# Patient Record
Sex: FEMALE | Race: WHITE | NOT HISPANIC OR LATINO | Employment: FULL TIME
[De-identification: names, ages, dates, MRNs, and addresses within clinical notes are randomized per-mention and may not be internally consistent; named-entity substitution may affect disease eponyms.]

---

## 2017-10-29 ENCOUNTER — HEALTH MAINTENANCE LETTER (OUTPATIENT)
Age: 32
End: 2017-10-29

## 2020-03-01 ENCOUNTER — HEALTH MAINTENANCE LETTER (OUTPATIENT)
Age: 35
End: 2020-03-01

## 2020-12-14 ENCOUNTER — HEALTH MAINTENANCE LETTER (OUTPATIENT)
Age: 35
End: 2020-12-14

## 2021-04-17 ENCOUNTER — HEALTH MAINTENANCE LETTER (OUTPATIENT)
Age: 36
End: 2021-04-17

## 2021-10-02 ENCOUNTER — HEALTH MAINTENANCE LETTER (OUTPATIENT)
Age: 36
End: 2021-10-02

## 2022-01-27 ENCOUNTER — APPOINTMENT (OUTPATIENT)
Dept: GENERAL RADIOLOGY | Facility: CLINIC | Age: 37
End: 2022-01-27
Attending: INTERNAL MEDICINE
Payer: COMMERCIAL

## 2022-01-27 ENCOUNTER — HOSPITAL ENCOUNTER (EMERGENCY)
Facility: CLINIC | Age: 37
Discharge: HOME | End: 2022-01-28
Attending: INTERNAL MEDICINE | Admitting: INTERNAL MEDICINE
Payer: COMMERCIAL

## 2022-01-27 DIAGNOSIS — S82.892A ANKLE FRACTURE, LEFT, CLOSED, INITIAL ENCOUNTER: ICD-10-CM

## 2022-01-27 DIAGNOSIS — R55 VASOVAGAL SYNCOPE: ICD-10-CM

## 2022-01-27 PROCEDURE — 250N000013 HC RX MED GY IP 250 OP 250 PS 637: Performed by: INTERNAL MEDICINE

## 2022-01-27 PROCEDURE — 999N000065 XR ANKLE LEFT G/E 3 VIEWS: Mod: LT

## 2022-01-27 PROCEDURE — 27788 TREATMENT OF ANKLE FRACTURE: CPT | Mod: 54 | Performed by: INTERNAL MEDICINE

## 2022-01-27 PROCEDURE — 99285 EMERGENCY DEPT VISIT HI MDM: CPT | Mod: 25 | Performed by: INTERNAL MEDICINE

## 2022-01-27 PROCEDURE — 73610 X-RAY EXAM OF ANKLE: CPT | Mod: LT

## 2022-01-27 PROCEDURE — 27788 TREATMENT OF ANKLE FRACTURE: CPT | Mod: LT | Performed by: INTERNAL MEDICINE

## 2022-01-27 PROCEDURE — 99284 EMERGENCY DEPT VISIT MOD MDM: CPT | Mod: 25 | Performed by: INTERNAL MEDICINE

## 2022-01-27 RX ORDER — OXYCODONE AND ACETAMINOPHEN 5; 325 MG/1; MG/1
1 TABLET ORAL
Qty: 8 TABLET | Refills: 0 | Status: SHIPPED | OUTPATIENT
Start: 2022-01-27 | End: 2022-01-30

## 2022-01-27 RX ORDER — OXYCODONE AND ACETAMINOPHEN 5; 325 MG/1; MG/1
1 TABLET ORAL ONCE
Status: COMPLETED | OUTPATIENT
Start: 2022-01-27 | End: 2022-01-27

## 2022-01-27 RX ADMIN — OXYCODONE HYDROCHLORIDE AND ACETAMINOPHEN 1 TABLET: 5; 325 TABLET ORAL at 22:26

## 2022-01-28 ENCOUNTER — APPOINTMENT (OUTPATIENT)
Dept: GENERAL RADIOLOGY | Facility: CLINIC | Age: 37
End: 2022-01-28
Payer: COMMERCIAL

## 2022-01-28 ENCOUNTER — TELEPHONE (OUTPATIENT)
Dept: ORTHOPEDICS | Facility: CLINIC | Age: 37
End: 2022-01-28
Payer: COMMERCIAL

## 2022-01-28 ENCOUNTER — APPOINTMENT (OUTPATIENT)
Dept: GENERAL RADIOLOGY | Facility: CLINIC | Age: 37
End: 2022-01-28
Attending: EMERGENCY MEDICINE
Payer: COMMERCIAL

## 2022-01-28 VITALS
SYSTOLIC BLOOD PRESSURE: 107 MMHG | HEART RATE: 70 BPM | TEMPERATURE: 98.2 F | BODY MASS INDEX: 27.63 KG/M2 | WEIGHT: 156 LBS | RESPIRATION RATE: 18 BRPM | OXYGEN SATURATION: 97 % | DIASTOLIC BLOOD PRESSURE: 68 MMHG

## 2022-01-28 PROCEDURE — 999N000065 XR ANKLE LEFT G/E 3 VIEWS: Mod: LT

## 2022-01-28 PROCEDURE — 250N000013 HC RX MED GY IP 250 OP 250 PS 637: Performed by: EMERGENCY MEDICINE

## 2022-01-28 RX ORDER — OXYCODONE AND ACETAMINOPHEN 5; 325 MG/1; MG/1
1 TABLET ORAL ONCE
Status: COMPLETED | OUTPATIENT
Start: 2022-01-28 | End: 2022-01-28

## 2022-01-28 RX ADMIN — OXYCODONE HYDROCHLORIDE AND ACETAMINOPHEN 1 TABLET: 5; 325 TABLET ORAL at 03:24

## 2022-01-28 ASSESSMENT — ENCOUNTER SYMPTOMS
ABDOMINAL PAIN: 0
HEADACHES: 0
DIFFICULTY URINATING: 0
ARTHRALGIAS: 0
CONFUSION: 0
SHORTNESS OF BREATH: 0
NECK STIFFNESS: 0
COLOR CHANGE: 0
EYE REDNESS: 0
FEVER: 0

## 2022-01-28 NOTE — TELEPHONE ENCOUNTER
received a call from the call center about triaging this patients fracture. I spoke with Dr. Mandujano and Frandy about this patients ankle fracture. They stated that this was a surgical case and Dr. Mandujano stated that he would be able to see it on Wednesday next week since the surgery would need to be within 2 weeks. I provided the call center with an appointment time on Wednesday February 1st at 2:30 pm. Since this was the only time available and the other providers who would be able to see for this do not have availability until February 11th.    Yes

## 2022-01-28 NOTE — ED PROVIDER NOTES
"    [facility] EMERGENCY DEPARTMENT ([facility])       1/27/22  History     Chief Complaint   Patient presents with     Ankle Pain     fell and collided with another  tonight around 2000, ended up hurting her left ankle     The history is provided by the patient and medical records.     Mone Torres is a 36 year old female who presents to the Emergency Department for evaluation of left ankle pain. Patient fell while playing hockey and collided into another player at 8pm tonight. She states \"my toes went one way and my foot went another.\" Patient cannot bear weight on the left leg. She states the pain is in the lateral malleolus of the left ankle.      Past Medical History  History reviewed. No pertinent past medical history.  History reviewed. No pertinent surgical history.  oxyCODONE-acetaminophen (PERCOCET) 5-325 MG tablet  modafinil (PROVIGIL) 200 MG tablet      No Known Allergies  Family History  Family History   Problem Relation Age of Onset     Eye Disorder Mother      Thyroid Disease Mother      Hypertension Father      Eye Disorder Father      Alzheimer Disease Maternal Grandmother      Congenital Anomalies Maternal Grandmother      Eye Disorder Maternal Grandmother      Diabetes Maternal Grandfather      Cancer Maternal Grandfather      Asthma Paternal Grandmother      Diabetes Paternal Grandmother      Cancer Paternal Grandmother      Eye Disorder Paternal Grandmother      Respiratory Paternal Grandmother      Hypertension Sister      Eye Disorder Sister      Social History   Social History     Tobacco Use     Smoking status: Current Some Day Smoker     Smokeless tobacco: Never Used   Substance Use Topics     Alcohol use: Yes     Drug use: No      Past medical history, past surgical history, medications, allergies, family history, and social history were reviewed with the patient. No additional pertinent items.     I have reviewed the Medications, Allergies, Past Medical and Surgical " History, and Social History in the Epic system.    Review of Systems   Constitutional: Negative for fever.   HENT: Negative for congestion.    Eyes: Negative for redness.   Respiratory: Negative for shortness of breath.    Cardiovascular: Negative for chest pain.   Gastrointestinal: Negative for abdominal pain.   Genitourinary: Negative for difficulty urinating.   Musculoskeletal: Negative for arthralgias and neck stiffness.        Positive for left ankle pain   Skin: Negative for color change.   Neurological: Negative for headaches.   Psychiatric/Behavioral: Negative for confusion.     A complete review of systems was performed with pertinent positives and negatives noted in the HPI, and all other systems negative.    Physical Exam   BP: 111/71  Pulse: 96  Temp: 98.1  F (36.7  C)  Resp: 16  Weight: 70.8 kg (156 lb)  SpO2: 97 %      Physical Exam  Constitutional:       General: She is not in acute distress.     Appearance: She is not diaphoretic.   HENT:      Head: Atraumatic.      Mouth/Throat:      Pharynx: No oropharyngeal exudate.   Eyes:      General: No scleral icterus.     Pupils: Pupils are equal, round, and reactive to light.   Cardiovascular:      Rate and Rhythm: Regular rhythm.      Heart sounds: Normal heart sounds.   Pulmonary:      Effort: No respiratory distress.      Breath sounds: Normal breath sounds.   Chest:      Chest wall: No tenderness.   Abdominal:      General: Bowel sounds are normal.      Palpations: Abdomen is soft.      Tenderness: There is no abdominal tenderness.   Musculoskeletal:      Cervical back: No tenderness.      Thoracic back: No tenderness.      Lumbar back: No tenderness.      Right ankle: Normal.      Right Achilles Tendon: Normal.      Left ankle: Swelling present. No deformity, ecchymosis or lacerations. Tenderness present over the lateral malleolus and medial malleolus. No ATF ligament, AITF ligament, CF ligament, posterior TF ligament, base of 5th metatarsal or  proximal fibula tenderness. Decreased range of motion. Anterior drawer test negative. Normal pulse.      Left Achilles Tendon: Normal. No tenderness or defects. Baron's test negative.        Legs:    Skin:     General: Skin is warm.      Findings: No rash.   Neurological:      Mental Status: She is oriented to person, place, and time.         ED Course   At 9:20 PM the patient was seen and examined by Cordell Mayer MD in Room ED04.        [facility]    -Fracture    Date/Time: 1/28/2022 8:21 PM  Performed by: Cordell Mayer MD  Authorized by: Cordell Mayer MD     Risks, benefits and alternatives discussed.      INJURY      Injury location:  Ankle    Ankle injury location:  L ankle    Ankle fracture type: bimalleolar      PRE PROCEDURE ASSESSMENT      Neurological function: normal      Distal perfusion: normal      Range of motion: reduced      ANESTHESIA (see MAR for exact dosages)      Anesthesia method:  None    PROCEDURE DETAILS:     Manipulation performed: yes      Skin traction used: yes      Skeletal traction used: yes      Pin inserted: no      Reduction successful: yes      X-ray confirmed reduction: yes      Immobilization:  Splint    Splint type:  Sugar tong    Supplies used:  Plaster    POST PROCEDURE ASSESSMENT      Neurological function: normal      Distal perfusion: normal      Range of motion: normal        PROCEDURE    Patient Tolerance:  Patient tolerated the procedure well with no immediate complications                Results for orders placed or performed during the hospital encounter of 01/27/22 (from the past 24 hour(s))   XR Ankle Left G/E 3 Views    Narrative    EXAM: XR ANKLE LEFT G/E 3 VIEWS  LOCATION: [facility]  DATE/TIME: 1/27/2022 9:49 PM    INDICATION: fall pain  COMPARISON: None.      Impression    IMPRESSION: Oblique fracture distal fibular metaphysis extending down to just above the  mortise joint. There is approximately 4 mm of distraction of the fracture fragment.  There is mild widening of the medial aspect of mortise joint. No medial malleolar fracture. Mild surrounding soft tissue swelling.   XR Ankle Left G/E 3 Views    Narrative    EXAM: XR ANKLE LEFT G/E 3 VIEWS  LOCATION: [facility]  DATE/TIME: 1/28/2022 12:02 AM    INDICATION: Post reduction splint  COMPARISON: 1/27/2022      Impression    IMPRESSION: Overlapping cast material obscures some detail. Oblique minimally displaced distal left fibular fracture just above the level the ankle mortise. Alignment appears slightly improved from the prior study. Questionable slight widening of the   distance between the talus and medial malleolus as was indicated on the prior study. Remainder unremarkable.   XR Ankle Left G/E 3 Views    Narrative    EXAM: XR ANKLE LEFT G/E 3 VIEWS  LOCATION: [facility]  DATE/TIME: 1/28/2022 1:42 AM    INDICATION: Post splint  COMPARISON: 1/27/2022 and 1/28/2022.      Impression    IMPRESSION: Overlapping cast material obscures detail. Oblique mildly displaced distal left fibular fracture similar to previous. Disruption of the ankle mortise with some lateral subluxation of the talus relative to the tibia and widening of the   distance between the tibial plateau and distal tibia laterally. The disruption of the ankle mortise is more pronounced than seen on the most recent study. Clinical correlation.   XR Ankle Left G/E 3 Views    Narrative    EXAM: XR ANKLE LEFT G/E 3 VIEWS  LOCATION: [facility]  DATE/TIME: 1/28/2022 2:54 AM    INDICATION: post reduction  COMPARISON: X-ray performed earlier same day      Impression    IMPRESSION: Status post splinting and reduction of the previously seen acute oblique fracture through the distal fibula at the level of the syndesmosis. Degree of  widening of the lateral tibial talar distance appears partially reduced with minimal   residual subluxation.     Medications   oxyCODONE-acetaminophen (PERCOCET) 5-325 MG per tablet 1 tablet (1 tablet Oral Given 1/27/22 2226)   oxyCODONE-acetaminophen (PERCOCET) 5-325 MG per tablet 1 tablet (1 tablet Oral Given 1/28/22 0324)             Assessments & Plan (with Medical Decision Making)    36 yof with left ankle fx-distal fibular and widened medial malleolus, D/W Ortho-reduction and sugartongue splint done, postreduction XR improved, let Ortho decided for better reduction, signed off to incoming EP. Arranged pain meds, crutches and Ortho follow up.         I have reviewed the nursing notes.    I have reviewed the findings, diagnosis, plan and need for follow up with the patient.    Discharge Medication List as of 1/28/2022  3:13 AM      START taking these medications    Details   oxyCODONE-acetaminophen (PERCOCET) 5-325 MG tablet Take 1 tablet by mouth nightly as needed for pain, Disp-8 tablet, R-0, Local Print             Final diagnoses:   Ankle fracture, left, closed, initial encounter   Vasovagal syncope       Cherry GAMBINO am serving as a trained medical scribe to document services personally performed by Cordell Mayer MD, based on the provider's statements to me.      Cordell GAMBINO MD, was physically present and have reviewed and verified the accuracy of this note documented by Cherry Mccracken.     Cordell Mayer MD  1/27/2022   [facility] EMERGENCY DEPARTMENT     Cordell Mayer MD  01/28/22 2023

## 2022-01-28 NOTE — CONSULTS
[facility]  ORTHOPAEDIC SURGERY CONSULT    DATE OF CONSULT: 1/28/2022 1:06 AM    REQUESTING PROVIDER: Dr Mayer    CC: Left ankle injury    DATE OF INJURY: 1/27/22    HISTORY OF PRESENT ILLNESS:   Mone Torres is a 36 year old female who was playing hockey and caught her toe and twisted her left ankle. Noticed immediate pain difficulty with weightbearing. Presents for evaluation and x-rays revealed a displaced lateral malleolus fracture with medial clear space widening. A splint was placed by the emergency department over there was persistent lateral subluxation of the talus. She does not report pain anywhere else in her body. She is otherwise been in her usual state of health. Mild pain currently. Denies any numbness or tingling.    PAST MEDICAL HISTORY:   History reviewed. No pertinent past medical history.      PAST SURGICAL HISTORY:    History reviewed. No pertinent surgical history.    MEDICATIONS:   Prior to Admission medications    Medication Sig Last Dose Taking? Auth Provider   oxyCODONE-acetaminophen (PERCOCET) 5-325 MG tablet Take 1 tablet by mouth nightly as needed for pain  Yes Cordell Mayer MD   modafinil (PROVIGIL) 200 MG tablet Take 1 tablet by mouth 1 hr prior to start of work shift.   Frank Conway PA-C       ALLERGIES:   Patient has no known allergies.    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     PHYSICAL EXAM:   Vitals:    01/27/22 2104   BP: 111/71   Pulse: 96   Resp: 16   Temp: 98.1  F (36.7  C)   TempSrc: Oral   SpO2: 97%   Weight: 70.8 kg (156 lb)     General: Awake, alert, appropriate, following commands, NAD.  Neuro: CN II-XII grossly intact.   Skin: No rashes,  skin color normal.  HEENT: Atraumatic  Lungs: Breathing comfortably and nonlabored, no wheezes or stridor noted.  Heart/Cardiovascular: Regular pulse, no peripheral  cyanosis.    Left Lower Extremity:   Splint removed  Mild swelling and ecchymosis about the ankle  No open wounds  Tenderness palpation at the distal fibula. No tenderness to palpation of the leg, knee, thigh.  Fires TA/GSC/EHL/FHL  SILT sp/dp/tibial/saph/sural nerves.    toes warm and well perfused.     LABS:  No results found for: HGB  No results found for: WBC  No results found for: PLT  No results found for: INR  No results found for: CR  Glucose   Date Value Ref Range Status   03/19/2012 85 60 - 99 mg/dL Final       IMAGING:  X-rays of the left ankle: There is a oblique distal fibula fracture with medial clear space widening about 5 mm. Postreduction x-rays show persistent medial clear space widening.  Postreduction x-rays #2: over reduction with excessive talar tilt  Post reduction #3: near anatomic ankle joint reduction    IMPRESSION:   Mone Torres is a 36 year old female who fell while playing hockey today and sustained a Varner B left ankle fracture with medial clear space widening. Medial clear space was persistently widened after reduction attempt by the emergency department. Therefore discussed with her the risks and benefits to repeat reduction and splinting. She was amenable and the splint was replaced. Post reduction x-rays revealed an over reduction and again discussed repeat splinting which she was amenable to and subsequent xrays revealed improved reduction of the ankle joint.    RECOMMENDATIONS:   Nonweightbearing left lower extremity  Crutches  Pain control per the ED  Okay to discharge from the emergency department    Follow-up next week with Dr. Mandujano for surgical discussion    Assessment and Plan discussed with Dr. Mandujano, Orthopaedic Surgery Staff.     Jimenez Padilla MD 1/28/2022 1:06 AM  Orthopaedic Surgery Resident, PGY-4

## 2022-01-28 NOTE — ED NOTES
The patient was accepted at shift change signout pending evaluation by Ortho.  Orthopedics did complete a new reduction, but postreduction films revealed that the alignment appears somewhat worse than the first time.  He did reduce again at the bedside.  Both the orthopedist and the patient report that she was having a lot of discomfort with the reduction, she sat up, then had a brief syncopal episode.  She is now completely back to baseline, this happened nearly immediately following waking up.  She unfortunately was not on the monitor, and it was not informed of this for several minutes afterwards.  Repeat vital signs are now normal.  Certainly by history it seems to represent a vasovagal syncope.  I do not think this needs to be evaluated further at this point.  This last reduction was better, per orthopedics, and she is ready to be discharged home.  She is given crutches, instructed to be nonweightbearing, given a prescription for Percocet, and splint care was discussed.  Orthopedics will call her tomorrow for a follow-up appointment.  She is encouraged to return with any concerns.  Verbalizes understanding is agreeable to the plan.    Dictation Disclaimer: Some of this Note has been completed with voice-recognition dictation software. Although errors are generally corrected real-time, there is the potential for a rare error to be present in the completed chart.       Sujata Farias MD  01/28/22 7791

## 2022-01-28 NOTE — TELEPHONE ENCOUNTER
Called twice and hour apart, LVM on second call to please return call to schedule for ankle fx.  can see her on Wednesday 2/2/22 at 8:30 am, 1:15 pm , or 2:00 pm. Please schedule if she calls back.

## 2022-02-01 DIAGNOSIS — S82.892A CLOSED LEFT ANKLE FRACTURE: Primary | ICD-10-CM

## 2022-05-14 ENCOUNTER — HEALTH MAINTENANCE LETTER (OUTPATIENT)
Age: 37
End: 2022-05-14

## 2022-09-03 ENCOUNTER — HEALTH MAINTENANCE LETTER (OUTPATIENT)
Age: 37
End: 2022-09-03

## 2023-05-03 NOTE — DISCHARGE INSTRUCTIONS
You should receive a call from orthopedics with an appointment date/time. If you don't, please make an appointment to follow up with Orthopedics Clinic (phone: [phone]) in 3-7 days even if entirely better.       
23

## 2023-06-02 ENCOUNTER — HEALTH MAINTENANCE LETTER (OUTPATIENT)
Age: 38
End: 2023-06-02

## 2024-07-06 ENCOUNTER — HEALTH MAINTENANCE LETTER (OUTPATIENT)
Age: 39
End: 2024-07-06